# Patient Record
Sex: FEMALE | Race: BLACK OR AFRICAN AMERICAN | NOT HISPANIC OR LATINO | URBAN - METROPOLITAN AREA
[De-identification: names, ages, dates, MRNs, and addresses within clinical notes are randomized per-mention and may not be internally consistent; named-entity substitution may affect disease eponyms.]

---

## 2018-01-29 ENCOUNTER — APPOINTMENT (RX ONLY)
Dept: URBAN - METROPOLITAN AREA CLINIC 63 | Facility: CLINIC | Age: 38
Setting detail: DERMATOLOGY
End: 2018-01-29

## 2018-01-29 DIAGNOSIS — L63.8 OTHER ALOPECIA AREATA: ICD-10-CM

## 2018-01-29 PROBLEM — L85.3 XEROSIS CUTIS: Status: ACTIVE | Noted: 2018-01-29

## 2018-01-29 PROBLEM — L29.8 OTHER PRURITUS: Status: ACTIVE | Noted: 2018-01-29

## 2018-01-29 PROBLEM — J30.1 ALLERGIC RHINITIS DUE TO POLLEN: Status: ACTIVE | Noted: 2018-01-29

## 2018-01-29 PROCEDURE — ? TREATMENT REGIMEN

## 2018-01-29 PROCEDURE — 11900 INJECT SKIN LESIONS </W 7: CPT

## 2018-01-29 PROCEDURE — ? COUNSELING

## 2018-01-29 PROCEDURE — ? INTRALESIONAL KENALOG

## 2018-01-29 ASSESSMENT — LOCATION SIMPLE DESCRIPTION DERM: LOCATION SIMPLE: POSTERIOR SCALP

## 2018-01-29 ASSESSMENT — LOCATION DETAILED DESCRIPTION DERM: LOCATION DETAILED: LEFT OCCIPITAL SCALP

## 2018-01-29 ASSESSMENT — LOCATION ZONE DERM: LOCATION ZONE: SCALP

## 2018-01-29 ASSESSMENT — SEVERITY ASSESSMENT OVERALL AMONG ALL PATIENTS
IN YOUR EXPERIENCE, AMONG ALL PATIENTS YOU HAVE SEEN WITH THIS CONDITION, HOW SEVERE IS THIS PATIENT'S CONDITION?: S1 (LESS THAN 25% HAIR LOSS)

## 2018-01-29 NOTE — PROCEDURE: INTRALESIONAL KENALOG
Kenalog Preparation: Kenalog
Consent: The risks of atrophy were reviewed with the patient.
Medical Necessity Clause: This procedure was medically necessary because the lesions that were treated were:
Lot # (Optional): [id number]
Detail Level: Simple
Include Z78.9 (Other Specified Conditions Influencing Health Status) As An Associated Diagnosis?: No
Expiration Date (Optional): 3/2019
Concentration Of Solution Injected (Mg/Ml): 10.0
Total Volume Injected (Ccs- Only Use Numbers And Decimals): 1
X Size Of Lesion In Cm (Optional): 0

## 2018-02-28 ENCOUNTER — APPOINTMENT (RX ONLY)
Dept: URBAN - METROPOLITAN AREA CLINIC 63 | Facility: CLINIC | Age: 38
Setting detail: DERMATOLOGY
End: 2018-02-28

## 2018-02-28 DIAGNOSIS — L63.8 OTHER ALOPECIA AREATA: ICD-10-CM

## 2018-02-28 PROCEDURE — ? COUNSELING

## 2018-02-28 PROCEDURE — 11900 INJECT SKIN LESIONS </W 7: CPT

## 2018-02-28 PROCEDURE — ? INTRALESIONAL KENALOG

## 2018-02-28 ASSESSMENT — LOCATION SIMPLE DESCRIPTION DERM
LOCATION SIMPLE: SCALP
LOCATION SIMPLE: POSTERIOR SCALP

## 2018-02-28 ASSESSMENT — LOCATION DETAILED DESCRIPTION DERM
LOCATION DETAILED: MID-OCCIPITAL SCALP
LOCATION DETAILED: RIGHT SUPERIOR PARIETAL SCALP

## 2018-02-28 ASSESSMENT — LOCATION ZONE DERM: LOCATION ZONE: SCALP

## 2018-02-28 NOTE — PROCEDURE: INTRALESIONAL KENALOG
Include Z78.9 (Other Specified Conditions Influencing Health Status) As An Associated Diagnosis?: No
X Size Of Lesion In Cm (Optional): 0
Detail Level: Detailed
Consent: The risks of atrophy were reviewed with the patient.
Concentration Of Solution Injected (Mg/Ml): 10.0
Medical Necessity Clause: This procedure was medically necessary because the lesions that were treated were:
Kenalog Preparation: Kenalog
Total Volume Injected (Ccs- Only Use Numbers And Decimals): 1